# Patient Record
Sex: MALE | Race: WHITE | NOT HISPANIC OR LATINO
[De-identification: names, ages, dates, MRNs, and addresses within clinical notes are randomized per-mention and may not be internally consistent; named-entity substitution may affect disease eponyms.]

---

## 2020-07-13 ENCOUNTER — TRANSCRIPTION ENCOUNTER (OUTPATIENT)
Age: 85
End: 2020-07-13

## 2020-08-20 ENCOUNTER — TRANSCRIPTION ENCOUNTER (OUTPATIENT)
Age: 85
End: 2020-08-20

## 2020-10-04 ENCOUNTER — TRANSCRIPTION ENCOUNTER (OUTPATIENT)
Age: 85
End: 2020-10-04

## 2021-05-21 ENCOUNTER — EMERGENCY (EMERGENCY)
Facility: HOSPITAL | Age: 86
LOS: 0 days | Discharge: HOME | End: 2021-05-21
Attending: EMERGENCY MEDICINE | Admitting: EMERGENCY MEDICINE
Payer: MEDICARE

## 2021-05-21 VITALS
DIASTOLIC BLOOD PRESSURE: 79 MMHG | RESPIRATION RATE: 18 BRPM | WEIGHT: 154.98 LBS | SYSTOLIC BLOOD PRESSURE: 169 MMHG | OXYGEN SATURATION: 100 % | HEART RATE: 66 BPM | TEMPERATURE: 98 F

## 2021-05-21 VITALS — HEART RATE: 62 BPM | DIASTOLIC BLOOD PRESSURE: 70 MMHG | SYSTOLIC BLOOD PRESSURE: 154 MMHG

## 2021-05-21 DIAGNOSIS — I10 ESSENTIAL (PRIMARY) HYPERTENSION: ICD-10-CM

## 2021-05-21 DIAGNOSIS — R42 DIZZINESS AND GIDDINESS: ICD-10-CM

## 2021-05-21 DIAGNOSIS — F03.90 UNSPECIFIED DEMENTIA WITHOUT BEHAVIORAL DISTURBANCE: ICD-10-CM

## 2021-05-21 DIAGNOSIS — E11.9 TYPE 2 DIABETES MELLITUS WITHOUT COMPLICATIONS: ICD-10-CM

## 2021-05-21 DIAGNOSIS — E78.5 HYPERLIPIDEMIA, UNSPECIFIED: ICD-10-CM

## 2021-05-21 DIAGNOSIS — Z98.890 OTHER SPECIFIED POSTPROCEDURAL STATES: Chronic | ICD-10-CM

## 2021-05-21 DIAGNOSIS — D64.9 ANEMIA, UNSPECIFIED: ICD-10-CM

## 2021-05-21 LAB
ALBUMIN SERPL ELPH-MCNC: 4.6 G/DL — SIGNIFICANT CHANGE UP (ref 3.5–5.2)
ALP SERPL-CCNC: 88 U/L — SIGNIFICANT CHANGE UP (ref 30–115)
ALT FLD-CCNC: 26 U/L — SIGNIFICANT CHANGE UP (ref 0–41)
ANION GAP SERPL CALC-SCNC: 16 MMOL/L — HIGH (ref 7–14)
AST SERPL-CCNC: 30 U/L — SIGNIFICANT CHANGE UP (ref 0–41)
BASOPHILS # BLD AUTO: 0.02 K/UL — SIGNIFICANT CHANGE UP (ref 0–0.2)
BASOPHILS NFR BLD AUTO: 0.3 % — SIGNIFICANT CHANGE UP (ref 0–1)
BILIRUB SERPL-MCNC: 0.7 MG/DL — SIGNIFICANT CHANGE UP (ref 0.2–1.2)
BUN SERPL-MCNC: 22 MG/DL — HIGH (ref 10–20)
CALCIUM SERPL-MCNC: 9.6 MG/DL — SIGNIFICANT CHANGE UP (ref 8.5–10.1)
CHLORIDE SERPL-SCNC: 102 MMOL/L — SIGNIFICANT CHANGE UP (ref 98–110)
CO2 SERPL-SCNC: 23 MMOL/L — SIGNIFICANT CHANGE UP (ref 17–32)
CREAT SERPL-MCNC: 0.9 MG/DL — SIGNIFICANT CHANGE UP (ref 0.7–1.5)
EOSINOPHIL # BLD AUTO: 0.07 K/UL — SIGNIFICANT CHANGE UP (ref 0–0.7)
EOSINOPHIL NFR BLD AUTO: 1.2 % — SIGNIFICANT CHANGE UP (ref 0–8)
GLUCOSE SERPL-MCNC: 102 MG/DL — HIGH (ref 70–99)
HCT VFR BLD CALC: 37.2 % — LOW (ref 42–52)
HGB BLD-MCNC: 12.9 G/DL — LOW (ref 14–18)
IMM GRANULOCYTES NFR BLD AUTO: 0.3 % — SIGNIFICANT CHANGE UP (ref 0.1–0.3)
LYMPHOCYTES # BLD AUTO: 1.02 K/UL — LOW (ref 1.2–3.4)
LYMPHOCYTES # BLD AUTO: 17.5 % — LOW (ref 20.5–51.1)
MCHC RBC-ENTMCNC: 31.6 PG — HIGH (ref 27–31)
MCHC RBC-ENTMCNC: 34.7 G/DL — SIGNIFICANT CHANGE UP (ref 32–37)
MCV RBC AUTO: 91.2 FL — SIGNIFICANT CHANGE UP (ref 80–94)
MONOCYTES # BLD AUTO: 0.57 K/UL — SIGNIFICANT CHANGE UP (ref 0.1–0.6)
MONOCYTES NFR BLD AUTO: 9.8 % — HIGH (ref 1.7–9.3)
NEUTROPHILS # BLD AUTO: 4.12 K/UL — SIGNIFICANT CHANGE UP (ref 1.4–6.5)
NEUTROPHILS NFR BLD AUTO: 70.9 % — SIGNIFICANT CHANGE UP (ref 42.2–75.2)
NRBC # BLD: 0 /100 WBCS — SIGNIFICANT CHANGE UP (ref 0–0)
PLATELET # BLD AUTO: 170 K/UL — SIGNIFICANT CHANGE UP (ref 130–400)
POTASSIUM SERPL-MCNC: 4.8 MMOL/L — SIGNIFICANT CHANGE UP (ref 3.5–5)
POTASSIUM SERPL-SCNC: 4.8 MMOL/L — SIGNIFICANT CHANGE UP (ref 3.5–5)
PROT SERPL-MCNC: 6.7 G/DL — SIGNIFICANT CHANGE UP (ref 6–8)
RBC # BLD: 4.08 M/UL — LOW (ref 4.7–6.1)
RBC # FLD: 12.7 % — SIGNIFICANT CHANGE UP (ref 11.5–14.5)
SODIUM SERPL-SCNC: 141 MMOL/L — SIGNIFICANT CHANGE UP (ref 135–146)
WBC # BLD: 5.82 K/UL — SIGNIFICANT CHANGE UP (ref 4.8–10.8)
WBC # FLD AUTO: 5.82 K/UL — SIGNIFICANT CHANGE UP (ref 4.8–10.8)

## 2021-05-21 PROCEDURE — 99285 EMERGENCY DEPT VISIT HI MDM: CPT

## 2021-05-21 PROCEDURE — 70450 CT HEAD/BRAIN W/O DYE: CPT | Mod: 26,MA

## 2021-05-21 RX ORDER — MECLIZINE HCL 12.5 MG
25 TABLET ORAL ONCE
Refills: 0 | Status: COMPLETED | OUTPATIENT
Start: 2021-05-21 | End: 2021-05-21

## 2021-05-21 RX ORDER — MECLIZINE HCL 12.5 MG
1 TABLET ORAL
Qty: 15 | Refills: 0
Start: 2021-05-21 | End: 2021-05-25

## 2021-05-21 RX ADMIN — Medication 25 MILLIGRAM(S): at 10:37

## 2021-05-21 RX ADMIN — Medication 25 MILLIGRAM(S): at 10:29

## 2021-05-21 NOTE — ED PROVIDER NOTE - PROGRESS NOTE DETAILS
Authored by Dr. Cook: back from ct. continue monitoring. Authored by Dr. Cook: much improved. no symptoms. ambulatory in the ED without difficulty. son at bedside. will take pt home. f/u with dr Carr.  meclizine prescribed.

## 2021-05-21 NOTE — ED PROVIDER NOTE - PHYSICAL EXAMINATION
CONSTITUTIONAL: in no acute distress.   SKIN: warm, dry  HEAD: Normocephalic.  EYES: PERRL.  ENT: Airway clear.  NECK: Supple.  LYMPH: No acute cervical adenopathy.  CARD: Regular rate and rhythm.   RESP: No wheezing, rales or rhonchi.  ABD: soft ntnd  EXT: No clubbing, cyanosis.   NEURO: Alert, oriented. gait steady, no dysmetria, no pronator drift, speech clear, CN II-XII grossly intact, negative romberg  PSYCH: Cooperative, appropriate.

## 2021-05-21 NOTE — ED ADULT NURSE NOTE - NSIMPLEMENTINTERV_GEN_ALL_ED
Implemented All Fall with Harm Risk Interventions:  [place] to call system. Call bell, personal items and telephone within reach. Instruct patient to call for assistance. Room bathroom lighting operational. Non-slip footwear when patient is off stretcher. Physically safe environment: no spills, clutter or unnecessary equipment. Stretcher in lowest position, wheels locked, appropriate side rails in place. Provide visual cue, wrist band, yellow gown, etc. Monitor gait and stability. Monitor for mental status changes and reorient to person, place, and time. Review medications for side effects contributing to fall risk. Reinforce activity limits and safety measures with patient and family. Provide visual clues: red socks.

## 2021-05-21 NOTE — ED ADULT NURSE NOTE - OBJECTIVE STATEMENT
pt c/o dizziness x 2 days. pt states dizziness occurs when he suddenly turns his head. he states "the room is spinning" pt reports nausea, no vomiting. denies falls. denies fever/chills. pt has a h/o vertigo and iron transfusions.

## 2021-05-21 NOTE — ED PROVIDER NOTE - CLINICAL SUMMARY MEDICAL DECISION MAKING FREE TEXT BOX
vertigo, positional. no fnd. imaging and labs reviewed. meds given vertigo, positional. no fnd. imaging and labs reviewed. meds given  much improved. no symptoms. ambulatory in the ED without difficulty. son at bedside. will take pt home. f/u with dr Carr.  meclizine prescribed

## 2021-05-21 NOTE — ED PROVIDER NOTE - PATIENT PORTAL LINK FT
You can access the FollowMyHealth Patient Portal offered by [facility] by registering at the following website: [email or web address]. By joining [facility]’s FollowMyHealth portal, you will also be able to view your health information using other applications (apps) compatible with our system.

## 2021-05-21 NOTE — ED PROVIDER NOTE - OBJECTIVE STATEMENT
86M with past medical history of vertigo, anemia requiring iron infusion but no transfusions, HTN, and DM presents with dizziness yesterday and this morning. PT denies numbness, weakness, slurring of speech, nausea, vomiting, diarrhea, abdominal pain, dysuria. No problems with ambulation.

## 2021-05-21 NOTE — ED PROVIDER NOTE - NSFOLLOWUPINSTRUCTIONS_ED_ALL_ED_FT
SEE INFO SHEET    RETURN TO ED IF VOMITING, HEADACHE, CHEST PAIN OR WORSENING SYMPTOMS    FOLLOW UP WITH DR CRENSHAW IN 2-3 DAYS.

## 2021-05-21 NOTE — ED PROVIDER NOTE - ATTENDING CONTRIBUTION TO CARE
86 M hx of dm, htn, peripheral vertigo, now with 2 days of dizziness, worse with movt of head and certain positions. no ha. no vomiting, + mild nausea, no weakness.  vss, nontoxic, well appearing, PERRL, EOMI, pink conj, anicteric, tongue midline, MMM, neck supple, CTAB, RRR, equal radial pulses bilat, abd soft/nt/nd, no edema, no fnd. no rashes. nl gait, cn2-12 intact, no pronator drift, no cerebellar signs, no ataxia, nl finger to nose, neg rhomberg.   a/p: labs, imaging, reassess

## 2021-05-21 NOTE — ED PROVIDER NOTE - CARE PROVIDER_API CALL
Josh Carr  NEUROLOGY  [address] [place] [address]  Phone: [phone]  Fax: [phone]  Follow Up Time: 1-3 Days

## 2021-06-29 PROBLEM — R42 DIZZINESS AND GIDDINESS: Chronic | Status: ACTIVE | Noted: 2021-05-21

## 2021-06-29 PROBLEM — E11.9 TYPE 2 DIABETES MELLITUS WITHOUT COMPLICATIONS: Chronic | Status: ACTIVE | Noted: 2021-05-21

## 2021-06-29 PROBLEM — I10 ESSENTIAL (PRIMARY) HYPERTENSION: Chronic | Status: ACTIVE | Noted: 2021-05-21

## 2021-06-29 PROBLEM — E78.00 PURE HYPERCHOLESTEROLEMIA, UNSPECIFIED: Chronic | Status: ACTIVE | Noted: 2021-05-21

## 2021-06-29 PROBLEM — F03.90 UNSPECIFIED DEMENTIA WITHOUT BEHAVIORAL DISTURBANCE: Chronic | Status: ACTIVE | Noted: 2021-05-21

## 2021-07-01 ENCOUNTER — OUTPATIENT (OUTPATIENT)
Dept: OUTPATIENT SERVICES | Facility: HOSPITAL | Age: 86
LOS: 1 days | Discharge: HOME | End: 2021-07-01

## 2021-07-01 VITALS
TEMPERATURE: 97 F | SYSTOLIC BLOOD PRESSURE: 161 MMHG | HEIGHT: 69 IN | OXYGEN SATURATION: 100 % | WEIGHT: 154.98 LBS | RESPIRATION RATE: 20 BRPM | HEART RATE: 64 BPM | DIASTOLIC BLOOD PRESSURE: 76 MMHG

## 2021-07-01 VITALS — DIASTOLIC BLOOD PRESSURE: 75 MMHG | HEART RATE: 64 BPM | SYSTOLIC BLOOD PRESSURE: 163 MMHG

## 2021-07-01 DIAGNOSIS — Z98.890 OTHER SPECIFIED POSTPROCEDURAL STATES: Chronic | ICD-10-CM

## 2021-07-01 LAB — GLUCOSE BLDC GLUCOMTR-MCNC: 94 MG/DL — SIGNIFICANT CHANGE UP (ref 70–99)

## 2021-07-01 RX ORDER — ACETAMINOPHEN 500 MG
650 TABLET ORAL ONCE
Refills: 0 | Status: DISCONTINUED | OUTPATIENT
Start: 2021-07-01 | End: 2021-07-01

## 2021-07-01 NOTE — PRE-ANESTHESIA EVALUATION ADULT - NSANTHOSAYNRD_GEN_A_CORE
denies/No. GLENNY screening performed.  STOP BANG Legend: 0-2 = LOW Risk; 3-4 = INTERMEDIATE Risk; 5-8 = HIGH Risk

## 2021-07-01 NOTE — PRE-ANESTHESIA EVALUATION ADULT - NSANTHADDINFOFT_GEN_ALL_CORE
risks, benefits, alternatives, general anesthesia as a backup discussed with the patient and he agrees to proceed as planned

## 2021-07-01 NOTE — PACU DISCHARGE NOTE - COMMENTS
86 y o male S/P Right ECCE with IOL Implant and I Stent Inject, LSB/MAC without complications. VS /75 HR 65 RR 16 SaO2 100%. Pt tolerated procedure well.

## 2021-07-07 DIAGNOSIS — H40.89 OTHER SPECIFIED GLAUCOMA: ICD-10-CM

## 2021-07-07 DIAGNOSIS — E11.36 TYPE 2 DIABETES MELLITUS WITH DIABETIC CATARACT: ICD-10-CM

## 2021-07-07 DIAGNOSIS — H25.11 AGE-RELATED NUCLEAR CATARACT, RIGHT EYE: ICD-10-CM

## 2021-07-07 DIAGNOSIS — H21.561 PUPILLARY ABNORMALITY, RIGHT EYE: ICD-10-CM

## 2021-07-07 DIAGNOSIS — Z79.84 LONG TERM (CURRENT) USE OF ORAL HYPOGLYCEMIC DRUGS: ICD-10-CM

## 2021-07-08 ENCOUNTER — OUTPATIENT (OUTPATIENT)
Dept: OUTPATIENT SERVICES | Facility: HOSPITAL | Age: 86
LOS: 1 days | Discharge: HOME | End: 2021-07-08

## 2021-07-08 VITALS — RESPIRATION RATE: 17 BRPM | DIASTOLIC BLOOD PRESSURE: 78 MMHG | SYSTOLIC BLOOD PRESSURE: 143 MMHG | HEART RATE: 68 BPM

## 2021-07-08 VITALS
TEMPERATURE: 97 F | SYSTOLIC BLOOD PRESSURE: 134 MMHG | DIASTOLIC BLOOD PRESSURE: 67 MMHG | WEIGHT: 154.98 LBS | OXYGEN SATURATION: 100 % | HEART RATE: 66 BPM | HEIGHT: 69 IN | RESPIRATION RATE: 19 BRPM

## 2021-07-08 DIAGNOSIS — H26.9 UNSPECIFIED CATARACT: Chronic | ICD-10-CM

## 2021-07-08 DIAGNOSIS — Z98.890 OTHER SPECIFIED POSTPROCEDURAL STATES: Chronic | ICD-10-CM

## 2021-07-08 LAB — GLUCOSE BLDC GLUCOMTR-MCNC: 102 MG/DL — HIGH (ref 70–99)

## 2021-07-08 RX ORDER — BENAZEPRIL HYDROCHLORIDE 40 MG/1
1 TABLET ORAL
Qty: 0 | Refills: 0 | DISCHARGE

## 2021-07-08 RX ORDER — DONEPEZIL HYDROCHLORIDE 10 MG/1
1 TABLET, FILM COATED ORAL
Qty: 0 | Refills: 0 | DISCHARGE

## 2021-07-08 RX ORDER — LATANOPROST 0.05 MG/ML
1 SOLUTION/ DROPS OPHTHALMIC; TOPICAL
Qty: 0 | Refills: 0 | DISCHARGE

## 2021-07-08 RX ORDER — ATORVASTATIN CALCIUM 80 MG/1
1 TABLET, FILM COATED ORAL
Qty: 0 | Refills: 0 | DISCHARGE

## 2021-07-08 RX ORDER — OMEPRAZOLE 10 MG/1
1 CAPSULE, DELAYED RELEASE ORAL
Qty: 0 | Refills: 0 | DISCHARGE

## 2021-07-08 RX ORDER — METFORMIN HYDROCHLORIDE 850 MG/1
500 TABLET ORAL
Qty: 0 | Refills: 0 | DISCHARGE

## 2021-07-08 NOTE — ASU PREOP CHECKLIST - SITE MARKED BY SURGEON
----- Message from Kassi Veronica sent at 3/11/2019 11:45 AM CDT -----  Contact: [facility] Radha [phone]  Radha from [facility] is requesting a a new prescription for a diabetic meter and the diabetic supplies. Radha also needs clinical notes and necessity forms.      Fax# [phone]  Please advise, thanks   
yes

## 2021-07-08 NOTE — ASU PATIENT PROFILE, ADULT - BRAND OF COVID-19 VACCINATION
Pfizer dose 1 and 2 [General Appearance - Well Developed] : well developed [General Appearance - Well Nourished] : well nourished [Normal Appearance] : normal appearance [Well Groomed] : well groomed [General Appearance - In No Acute Distress] : no acute distress [Abdomen Soft] : soft [Abdomen Tenderness] : non-tender [Costovertebral Angle Tenderness] : no ~M costovertebral angle tenderness [Edema] : no peripheral edema [] : no respiratory distress [Respiration, Rhythm And Depth] : normal respiratory rhythm and effort [Exaggerated Use Of Accessory Muscles For Inspiration] : no accessory muscle use [Oriented To Time, Place, And Person] : oriented to person, place, and time [Affect] : the affect was normal [Mood] : the mood was normal [Not Anxious] : not anxious [Normal Station and Gait] : the gait and station were normal for the patient's age [No Focal Deficits] : no focal deficits [No Palpable Adenopathy] : no palpable adenopathy

## 2021-07-12 DIAGNOSIS — H40.10X2 UNSPECIFIED OPEN-ANGLE GLAUCOMA, MODERATE STAGE: ICD-10-CM

## 2021-07-12 DIAGNOSIS — E11.9 TYPE 2 DIABETES MELLITUS WITHOUT COMPLICATIONS: ICD-10-CM

## 2021-07-12 DIAGNOSIS — H25.12 AGE-RELATED NUCLEAR CATARACT, LEFT EYE: ICD-10-CM

## 2021-07-12 DIAGNOSIS — I10 ESSENTIAL (PRIMARY) HYPERTENSION: ICD-10-CM

## 2021-07-12 DIAGNOSIS — R42 DIZZINESS AND GIDDINESS: ICD-10-CM

## 2021-07-12 DIAGNOSIS — F03.90 UNSPECIFIED DEMENTIA WITHOUT BEHAVIORAL DISTURBANCE: ICD-10-CM

## 2021-07-12 DIAGNOSIS — E78.00 PURE HYPERCHOLESTEROLEMIA, UNSPECIFIED: ICD-10-CM

## 2021-09-30 ENCOUNTER — EMERGENCY (EMERGENCY)
Facility: HOSPITAL | Age: 86
LOS: 0 days | Discharge: HOME | End: 2021-09-30
Attending: STUDENT IN AN ORGANIZED HEALTH CARE EDUCATION/TRAINING PROGRAM | Admitting: STUDENT IN AN ORGANIZED HEALTH CARE EDUCATION/TRAINING PROGRAM
Payer: MEDICARE

## 2021-09-30 VITALS
RESPIRATION RATE: 18 BRPM | SYSTOLIC BLOOD PRESSURE: 192 MMHG | DIASTOLIC BLOOD PRESSURE: 82 MMHG | HEART RATE: 66 BPM | TEMPERATURE: 98 F | OXYGEN SATURATION: 98 % | HEIGHT: 69 IN

## 2021-09-30 DIAGNOSIS — I10 ESSENTIAL (PRIMARY) HYPERTENSION: ICD-10-CM
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
## 2021-09-30 DIAGNOSIS — E11.9 TYPE 2 DIABETES MELLITUS WITHOUT COMPLICATIONS: ICD-10-CM

## 2021-09-30 DIAGNOSIS — Z86.69 PERSONAL HISTORY OF OTHER DISEASES OF THE NERVOUS SYSTEM AND SENSE ORGANS: ICD-10-CM

## 2021-09-30 DIAGNOSIS — Z79.84 LONG TERM (CURRENT) USE OF ORAL HYPOGLYCEMIC DRUGS: ICD-10-CM

## 2021-09-30 DIAGNOSIS — Z98.890 OTHER SPECIFIED POSTPROCEDURAL STATES: Chronic | ICD-10-CM

## 2021-09-30 DIAGNOSIS — E78.5 HYPERLIPIDEMIA, UNSPECIFIED: ICD-10-CM

## 2021-09-30 DIAGNOSIS — H53.8 OTHER VISUAL DISTURBANCES: ICD-10-CM

## 2021-09-30 DIAGNOSIS — H57.11 OCULAR PAIN, RIGHT EYE: ICD-10-CM

## 2021-09-30 DIAGNOSIS — H26.9 UNSPECIFIED CATARACT: Chronic | ICD-10-CM

## 2021-09-30 DIAGNOSIS — S05.01XA INJURY OF CONJUNCTIVA AND CORNEAL ABRASION WITHOUT FOREIGN BODY, RIGHT EYE, INITIAL ENCOUNTER: ICD-10-CM

## 2021-09-30 PROCEDURE — 99283 EMERGENCY DEPT VISIT LOW MDM: CPT

## 2021-09-30 RX ORDER — OFLOXACIN 0.3 %
2 DROPS OPHTHALMIC (EYE)
Qty: 5 | Refills: 0
Start: 2021-09-30 | End: 2021-10-06

## 2021-09-30 NOTE — ED PROVIDER NOTE - NSFOLLOWUPCLINICS_GEN_ALL_ED_FT
[facility]  Ophthalmolgy  [address], [address]  [place] [address]  Phone: [phone]  Fax:   Follow Up Time: 4-6 Days

## 2021-09-30 NOTE — ED PROVIDER NOTE - NS ED ROS FT
Constitutional: No fever, chills, weight loss, generalized fatigue  Eyes:  +visual changes, +eye pain  and +discharge  Neuro:  No headache or weakness.  No LOC  Skin:  No skin rash

## 2021-09-30 NOTE — ED PROVIDER NOTE - PHYSICAL EXAMINATION
CONSTITUTIONAL: Well-developed; well-nourished  SKIN: warm, dry  EYES: PERRL, EOMI, right eye conjunctival erythema, R eye: 17mmHg 20/30 L eye: 15mmHg 20/25, right eye has horizontal linear corneal abrasion, no foreign body found  NEURO: Alert, oriented, grossly unremarkable  PSYCH: Cooperative, appropriate.

## 2021-09-30 NOTE — ED PROVIDER NOTE - OBJECTIVE STATEMENT
86yM w/ pmhx of glaucoma htn hld dm who present with right eye pain started yesterday. son stated that he saw pt scratch his eye when something flew into eye yesterday in their yard. eye is more blurry on right side, has pain, and light sensitivity.

## 2021-09-30 NOTE — ED PROVIDER NOTE - CLINICAL SUMMARY MEDICAL DECISION MAKING FREE TEXT BOX
86 yr old m that presents with corneal abrasion. will dc pt with otic antibiotics and optho follow up. pt given strict return precautions.

## 2021-09-30 NOTE — ED PROVIDER NOTE - PATIENT PORTAL LINK FT
You can access the FollowMyHealth Patient Portal offered by [facility] by registering at the following website: [email or web address]. By joining [employer]’s FollowMyHealth portal, you will also be able to view your health information using other applications (apps) compatible with our system.

## 2021-09-30 NOTE — ED PROVIDER NOTE - ATTENDING CONTRIBUTION TO CARE
86 yr old m w/ a pmh significant for glaucoma, htn, hld, dm who presents with R eye pain. Pt states that the pain started yesterday after scratching his eye. Pt does report some blurriness and pain, however, pt denies any HA, nausea, vomiting, fevers, chills or any other complaints.    VITAL SIGNS: I have reviewed nursing notes and confirm.  CONSTITUTIONAL: non-toxic, well appearing  SKIN: no rash, no petechiae.  EYES: PERRL, EOMI, right eye conjunctival erythema, R eye: 17mmHg 20/30 L eye: 15mmHg 20/25, right eye has horizontal linear corneal abrasion, no foreign body found  ENT: tongue midline, no exudates, MMM  NECK: Supple; no meningismus, no JVD  RESP: no respiratory distress  EXT: Normal ROM x4. No edema. No calves tenderness  NEURO: Alert, oriented. CN2-12 intact, equal strength bilaterally, nl gait.  PSYCH: Cooperative, appropriate.    a/p  86 yr old m that presents with corneal abrasion. will dc pt with otic antibiotics and optho follow up. pt given strict return precautions.

## 2021-09-30 NOTE — ED ADULT NURSE NOTE - NSSUHOSCREENINGYN_ED_ALL_ED
Seema from lab called, question if recollect is nec for repeat platelet count.   Yes - the patient is able to be screened

## 2022-03-23 ENCOUNTER — NEW REFERRAL (OUTPATIENT)
Dept: URBAN - METROPOLITAN AREA CLINIC 109 | Facility: CLINIC | Age: 87
End: 2022-03-23

## 2022-03-23 DIAGNOSIS — H43.393: ICD-10-CM

## 2022-03-23 DIAGNOSIS — H35.371: ICD-10-CM

## 2022-03-23 DIAGNOSIS — H43.823: ICD-10-CM

## 2022-03-23 PROCEDURE — 92202 OPSCPY EXTND ON/MAC DRAW: CPT

## 2022-03-23 PROCEDURE — 99244 OFF/OP CNSLTJ NEW/EST MOD 40: CPT

## 2022-03-23 PROCEDURE — 92134 CPTRZ OPH DX IMG PST SGM RTA: CPT

## 2022-03-23 ASSESSMENT — VISUAL ACUITY
OD_PH: 20/80
OS_SC: 20/160
OD_SC: 20/100
OS_PH: 20/50-3

## 2022-03-23 ASSESSMENT — TONOMETRY
OD_IOP_MMHG: 22
OS_IOP_MMHG: 19

## 2022-05-25 ENCOUNTER — FOLLOW UP (OUTPATIENT)
Dept: URBAN - METROPOLITAN AREA CLINIC 109 | Facility: CLINIC | Age: 87
End: 2022-05-25

## 2022-05-25 DIAGNOSIS — H43.823: ICD-10-CM

## 2022-05-25 DIAGNOSIS — H35.371: ICD-10-CM

## 2022-05-25 DIAGNOSIS — D31.32: ICD-10-CM

## 2022-05-25 DIAGNOSIS — H35.353: ICD-10-CM

## 2022-05-25 PROCEDURE — 92014 COMPRE OPH EXAM EST PT 1/>: CPT

## 2022-05-25 PROCEDURE — 92134 CPTRZ OPH DX IMG PST SGM RTA: CPT

## 2022-05-25 PROCEDURE — 92202 OPSCPY EXTND ON/MAC DRAW: CPT

## 2022-05-25 ASSESSMENT — TONOMETRY
OD_IOP_MMHG: 26
OS_IOP_MMHG: 28

## 2022-05-25 ASSESSMENT — VISUAL ACUITY
OD_PH: 20/60-1
OS_PH: 20/60-2
OS_SC: 20/160-1
OD_SC: 20/100+1

## 2022-06-29 ENCOUNTER — FOLLOW UP (OUTPATIENT)
Dept: URBAN - METROPOLITAN AREA CLINIC 109 | Facility: CLINIC | Age: 87
End: 2022-06-29

## 2022-06-29 DIAGNOSIS — H43.823: ICD-10-CM

## 2022-06-29 DIAGNOSIS — H35.353: ICD-10-CM

## 2022-06-29 DIAGNOSIS — H40.043: ICD-10-CM

## 2022-06-29 DIAGNOSIS — H35.371: ICD-10-CM

## 2022-06-29 PROCEDURE — 92014 COMPRE OPH EXAM EST PT 1/>: CPT

## 2022-06-29 PROCEDURE — 92134 CPTRZ OPH DX IMG PST SGM RTA: CPT

## 2022-06-29 PROCEDURE — 92202 OPSCPY EXTND ON/MAC DRAW: CPT

## 2022-06-29 ASSESSMENT — VISUAL ACUITY
OS_PH: 20/60+3
OD_SC: 20/60-1
OS_SC: 20/160+1
OD_PH: 20/50-3

## 2022-06-29 ASSESSMENT — TONOMETRY
OD_IOP_MMHG: 21
OS_IOP_MMHG: 18

## 2022-07-20 ENCOUNTER — SURGERY/PROCEDURE (OUTPATIENT)
Dept: URBAN - METROPOLITAN AREA MEDICAL CENTER 2 | Facility: MEDICAL CENTER | Age: 87
End: 2022-07-20

## 2022-07-20 DIAGNOSIS — H43.823: ICD-10-CM

## 2022-07-20 PROCEDURE — 67041 VIT FOR MACULAR PUCKER: CPT

## 2022-07-20 PROCEDURE — 67042 VIT FOR MACULAR HOLE: CPT

## 2022-07-21 ENCOUNTER — 1 DAY POST-OP (OUTPATIENT)
Dept: URBAN - METROPOLITAN AREA CLINIC 51 | Facility: CLINIC | Age: 87
End: 2022-07-21

## 2022-07-21 DIAGNOSIS — H43.823: ICD-10-CM

## 2022-07-21 PROCEDURE — 92202 OPSCPY EXTND ON/MAC DRAW: CPT

## 2022-07-21 PROCEDURE — 99024 POSTOP FOLLOW-UP VISIT: CPT

## 2022-07-21 ASSESSMENT — VISUAL ACUITY
OD_SC: 20/80-1
OS_SC: 1/200
OS_PH: 2/200
OD_PH: 20/50-2

## 2022-07-21 ASSESSMENT — TONOMETRY
OD_IOP_MMHG: 22
OS_IOP_MMHG: 21
OS_IOP_MMHG: 22
OD_IOP_MMHG: 21

## 2022-07-27 ENCOUNTER — FOLLOW UP (OUTPATIENT)
Dept: URBAN - METROPOLITAN AREA CLINIC 109 | Facility: CLINIC | Age: 87
End: 2022-07-27

## 2022-07-27 DIAGNOSIS — H40.043: ICD-10-CM

## 2022-07-27 DIAGNOSIS — H00.011: ICD-10-CM

## 2022-07-27 DIAGNOSIS — H35.371: ICD-10-CM

## 2022-07-27 DIAGNOSIS — H43.823: ICD-10-CM

## 2022-07-27 DIAGNOSIS — H35.353: ICD-10-CM

## 2022-07-27 PROCEDURE — 92202 OPSCPY EXTND ON/MAC DRAW: CPT

## 2022-07-27 PROCEDURE — 92134 CPTRZ OPH DX IMG PST SGM RTA: CPT

## 2022-07-27 PROCEDURE — 92014 COMPRE OPH EXAM EST PT 1/>: CPT

## 2022-07-27 ASSESSMENT — VISUAL ACUITY
OS_PH: 20/60-3
OD_SC: 20/80-1
OS_SC: 20/125-2
OD_PH: 20/50-3

## 2022-07-27 ASSESSMENT — TONOMETRY
OS_IOP_MMHG: 16
OD_IOP_MMHG: 19

## 2022-08-17 ENCOUNTER — POST-OP CHECK (OUTPATIENT)
Dept: URBAN - METROPOLITAN AREA CLINIC 109 | Facility: CLINIC | Age: 87
End: 2022-08-17

## 2022-08-17 DIAGNOSIS — H00.011: ICD-10-CM

## 2022-08-17 DIAGNOSIS — H35.353: ICD-10-CM

## 2022-08-17 DIAGNOSIS — H40.043: ICD-10-CM

## 2022-08-17 DIAGNOSIS — H43.823: ICD-10-CM

## 2022-08-17 DIAGNOSIS — H35.371: ICD-10-CM

## 2022-08-17 PROCEDURE — 92202 OPSCPY EXTND ON/MAC DRAW: CPT

## 2022-08-17 PROCEDURE — 99024 POSTOP FOLLOW-UP VISIT: CPT

## 2022-08-17 ASSESSMENT — TONOMETRY
OS_IOP_MMHG: 20
OD_IOP_MMHG: 19

## 2022-08-17 ASSESSMENT — VISUAL ACUITY
OS_PH: 20/60+3
OS_SC: 20/125-1
OD_PH: 20/60+3
OD_SC: 20/60-3

## 2022-09-02 ENCOUNTER — PROBLEM (OUTPATIENT)
Dept: URBAN - METROPOLITAN AREA CLINIC 109 | Facility: CLINIC | Age: 87
End: 2022-09-02

## 2022-09-02 DIAGNOSIS — H35.353: ICD-10-CM

## 2022-09-02 DIAGNOSIS — H35.371: ICD-10-CM

## 2022-09-02 DIAGNOSIS — H40.043: ICD-10-CM

## 2022-09-02 DIAGNOSIS — H00.011: ICD-10-CM

## 2022-09-02 DIAGNOSIS — H43.823: ICD-10-CM

## 2022-09-02 PROCEDURE — 92202 OPSCPY EXTND ON/MAC DRAW: CPT | Mod: NC

## 2022-09-02 PROCEDURE — 99024 POSTOP FOLLOW-UP VISIT: CPT

## 2022-09-02 PROCEDURE — 92134 CPTRZ OPH DX IMG PST SGM RTA: CPT | Mod: NC

## 2022-09-02 ASSESSMENT — VISUAL ACUITY
OS_SC: 20/125+1
OS_PH: 20/50-1
OD_SC: 20/60

## 2022-09-02 ASSESSMENT — TONOMETRY
OD_IOP_MMHG: 19
OS_IOP_MMHG: 18

## 2022-10-04 ENCOUNTER — PROBLEM (OUTPATIENT)
Dept: URBAN - METROPOLITAN AREA CLINIC 109 | Facility: CLINIC | Age: 87
End: 2022-10-04

## 2022-10-04 DIAGNOSIS — H35.353: ICD-10-CM

## 2022-10-04 DIAGNOSIS — H43.823: ICD-10-CM

## 2022-10-04 DIAGNOSIS — D31.32: ICD-10-CM

## 2022-10-04 PROCEDURE — 99024 POSTOP FOLLOW-UP VISIT: CPT

## 2022-10-04 PROCEDURE — 92134 CPTRZ OPH DX IMG PST SGM RTA: CPT

## 2022-10-04 PROCEDURE — 92202 OPSCPY EXTND ON/MAC DRAW: CPT

## 2022-10-04 ASSESSMENT — TONOMETRY
OD_IOP_MMHG: 17
OS_IOP_MMHG: 18

## 2022-10-04 ASSESSMENT — VISUAL ACUITY
OS_SC: 20/100-2
OD_PH: 20/60-3
OD_SC: 20/80
OS_PH: 20/50-3

## 2022-10-28 NOTE — ED ADULT NURSE NOTE - CADM POA URETHRAL CATHETER
Render In Strict Bullet Format?: No Detail Level: Zone Continue Regimen: Clobetasol cream bid to aa on knee No

## 2022-11-02 ENCOUNTER — FOLLOW UP (OUTPATIENT)
Dept: URBAN - METROPOLITAN AREA CLINIC 109 | Facility: CLINIC | Age: 87
End: 2022-11-02

## 2022-11-02 DIAGNOSIS — D31.32: ICD-10-CM

## 2022-11-02 DIAGNOSIS — H43.823: ICD-10-CM

## 2022-11-02 DIAGNOSIS — H35.353: ICD-10-CM

## 2022-11-02 PROCEDURE — 92134 CPTRZ OPH DX IMG PST SGM RTA: CPT

## 2022-11-02 PROCEDURE — 92202 OPSCPY EXTND ON/MAC DRAW: CPT

## 2022-11-02 PROCEDURE — 99214 OFFICE O/P EST MOD 30 MIN: CPT

## 2022-11-02 ASSESSMENT — TONOMETRY
OS_IOP_MMHG: 17
OD_IOP_MMHG: 14

## 2022-11-02 ASSESSMENT — VISUAL ACUITY
OS_SC: 20/100-2
OS_PH: 20/50-2
OD_PH: 20/50-2
OD_SC: 20/80-1

## 2023-01-25 PROBLEM — Z00.00 ENCOUNTER FOR PREVENTIVE HEALTH EXAMINATION: Status: ACTIVE | Noted: 2023-01-25

## 2023-01-26 ENCOUNTER — APPOINTMENT (OUTPATIENT)
Dept: NEUROLOGY | Facility: CLINIC | Age: 88
End: 2023-01-26

## 2023-01-30 ENCOUNTER — APPOINTMENT (OUTPATIENT)
Dept: NEUROLOGY | Facility: CLINIC | Age: 88
End: 2023-01-30

## 2023-01-30 RX ORDER — METFORMIN HYDROCHLORIDE 500 MG/1
500 TABLET, COATED ORAL
Refills: 0 | Status: ACTIVE | COMMUNITY

## 2023-01-30 RX ORDER — ATORVASTATIN CALCIUM 20 MG/1
20 TABLET, FILM COATED ORAL
Refills: 0 | Status: ACTIVE | COMMUNITY

## 2023-01-30 RX ORDER — MECLIZINE HCL 25 MG/1
25 TABLET ORAL
Refills: 0 | Status: ACTIVE | COMMUNITY

## 2023-02-01 ENCOUNTER — FOLLOW UP (OUTPATIENT)
Dept: URBAN - METROPOLITAN AREA CLINIC 109 | Facility: CLINIC | Age: 88
End: 2023-02-01

## 2023-02-01 DIAGNOSIS — H43.823: ICD-10-CM

## 2023-02-01 DIAGNOSIS — H40.043: ICD-10-CM

## 2023-02-01 DIAGNOSIS — H35.353: ICD-10-CM

## 2023-02-01 DIAGNOSIS — H35.371: ICD-10-CM

## 2023-02-01 PROCEDURE — 92202 OPSCPY EXTND ON/MAC DRAW: CPT

## 2023-02-01 PROCEDURE — 92014 COMPRE OPH EXAM EST PT 1/>: CPT

## 2023-02-01 PROCEDURE — 92134 CPTRZ OPH DX IMG PST SGM RTA: CPT

## 2023-02-01 ASSESSMENT — TONOMETRY
OD_IOP_MMHG: 12
OS_IOP_MMHG: 15

## 2023-02-01 ASSESSMENT — VISUAL ACUITY
OD_PH: 20/50+3
OS_PH: 20/50-2
OD_CC: 20/50-3
OS_CC: 20/60-2

## 2023-04-07 ENCOUNTER — FOLLOW UP (OUTPATIENT)
Dept: URBAN - METROPOLITAN AREA CLINIC 109 | Facility: CLINIC | Age: 88
End: 2023-04-07

## 2023-04-07 DIAGNOSIS — H35.353: ICD-10-CM

## 2023-04-07 DIAGNOSIS — H40.043: ICD-10-CM

## 2023-04-07 DIAGNOSIS — H43.823: ICD-10-CM

## 2023-04-07 DIAGNOSIS — D31.32: ICD-10-CM

## 2023-04-07 PROCEDURE — 92014 COMPRE OPH EXAM EST PT 1/>: CPT

## 2023-04-07 PROCEDURE — 92134 CPTRZ OPH DX IMG PST SGM RTA: CPT

## 2023-04-07 PROCEDURE — 92202 OPSCPY EXTND ON/MAC DRAW: CPT

## 2023-04-07 ASSESSMENT — TONOMETRY
OS_IOP_MMHG: 16
OD_IOP_MMHG: 17

## 2023-04-07 ASSESSMENT — VISUAL ACUITY
OS_CC: 20/80
OD_PH: 20/60
OD_CC: 20/60-3
OS_PH: 20/60-3

## 2023-07-14 ENCOUNTER — FOLLOW UP (OUTPATIENT)
Dept: URBAN - METROPOLITAN AREA CLINIC 109 | Facility: CLINIC | Age: 88
End: 2023-07-14

## 2023-07-14 DIAGNOSIS — H40.043: ICD-10-CM

## 2023-07-14 DIAGNOSIS — H35.353: ICD-10-CM

## 2023-07-14 DIAGNOSIS — E11.9: ICD-10-CM

## 2023-07-14 DIAGNOSIS — H43.823: ICD-10-CM

## 2023-07-14 PROCEDURE — 92134 CPTRZ OPH DX IMG PST SGM RTA: CPT

## 2023-07-14 PROCEDURE — 92014 COMPRE OPH EXAM EST PT 1/>: CPT

## 2023-07-14 PROCEDURE — 92202 OPSCPY EXTND ON/MAC DRAW: CPT

## 2023-07-14 ASSESSMENT — VISUAL ACUITY
OD_PH: 20/50
OS_SC: 20/60
OD_SC: 20/80

## 2023-07-14 ASSESSMENT — TONOMETRY
OS_IOP_MMHG: 20
OD_IOP_MMHG: 16

## 2023-10-13 ENCOUNTER — FOLLOW UP (OUTPATIENT)
Dept: URBAN - METROPOLITAN AREA CLINIC 109 | Facility: CLINIC | Age: 88
End: 2023-10-13

## 2023-10-13 DIAGNOSIS — H43.823: ICD-10-CM

## 2023-10-13 DIAGNOSIS — E11.9: ICD-10-CM

## 2023-10-13 DIAGNOSIS — H35.353: ICD-10-CM

## 2023-10-13 DIAGNOSIS — H40.043: ICD-10-CM

## 2023-10-13 PROCEDURE — 92202 OPSCPY EXTND ON/MAC DRAW: CPT

## 2023-10-13 PROCEDURE — 92134 CPTRZ OPH DX IMG PST SGM RTA: CPT

## 2023-10-13 PROCEDURE — 92014 COMPRE OPH EXAM EST PT 1/>: CPT

## 2023-10-13 ASSESSMENT — VISUAL ACUITY
OS_PH: 20/50-3
OD_CC: 20/60
OS_CC: 20/80

## 2023-10-13 ASSESSMENT — TONOMETRY
OS_IOP_MMHG: 18
OD_IOP_MMHG: 16

## 2024-01-26 ENCOUNTER — FOLLOW UP (OUTPATIENT)
Dept: URBAN - METROPOLITAN AREA CLINIC 109 | Facility: CLINIC | Age: 89
End: 2024-01-26

## 2024-01-26 DIAGNOSIS — H40.043: ICD-10-CM

## 2024-01-26 DIAGNOSIS — D31.32: ICD-10-CM

## 2024-01-26 DIAGNOSIS — H40.1131: ICD-10-CM

## 2024-01-26 DIAGNOSIS — H35.353: ICD-10-CM

## 2024-01-26 DIAGNOSIS — E11.9: ICD-10-CM

## 2024-01-26 DIAGNOSIS — H35.371: ICD-10-CM

## 2024-01-26 DIAGNOSIS — H43.823: ICD-10-CM

## 2024-01-26 PROCEDURE — 92014 COMPRE OPH EXAM EST PT 1/>: CPT

## 2024-01-26 PROCEDURE — 92134 CPTRZ OPH DX IMG PST SGM RTA: CPT

## 2024-01-26 PROCEDURE — 92202 OPSCPY EXTND ON/MAC DRAW: CPT

## 2024-01-26 ASSESSMENT — TONOMETRY
OS_IOP_MMHG: 17
OD_IOP_MMHG: 19

## 2024-01-26 ASSESSMENT — VISUAL ACUITY
OS_PH: 20/50+3
OS_CC: 20/50-2
OD_PH: 20/40+3
OD_CC: 20/40-1

## 2024-04-19 ENCOUNTER — FOLLOW UP (OUTPATIENT)
Dept: URBAN - METROPOLITAN AREA CLINIC 109 | Facility: CLINIC | Age: 89
End: 2024-04-19

## 2024-04-19 DIAGNOSIS — H40.1131: ICD-10-CM

## 2024-04-19 DIAGNOSIS — H35.353: ICD-10-CM

## 2024-04-19 DIAGNOSIS — H43.823: ICD-10-CM

## 2024-04-19 DIAGNOSIS — E11.9: ICD-10-CM

## 2024-04-19 PROCEDURE — 92202 OPSCPY EXTND ON/MAC DRAW: CPT

## 2024-04-19 PROCEDURE — 92014 COMPRE OPH EXAM EST PT 1/>: CPT

## 2024-04-19 PROCEDURE — 92134 CPTRZ OPH DX IMG PST SGM RTA: CPT

## 2024-04-19 ASSESSMENT — TONOMETRY
OS_IOP_MMHG: 17
OD_IOP_MMHG: 20

## 2024-04-19 ASSESSMENT — VISUAL ACUITY
OS_CC: 20/50-3
OD_CC: 20/50-2

## 2024-07-19 ENCOUNTER — FOLLOW UP (OUTPATIENT)
Dept: URBAN - METROPOLITAN AREA CLINIC 1 | Facility: CLINIC | Age: 89
End: 2024-07-19

## 2024-07-19 DIAGNOSIS — H40.1131: ICD-10-CM

## 2024-07-19 DIAGNOSIS — E11.9: ICD-10-CM

## 2024-07-19 DIAGNOSIS — H35.353: ICD-10-CM

## 2024-07-19 DIAGNOSIS — H43.823: ICD-10-CM

## 2024-07-19 DIAGNOSIS — D31.32: ICD-10-CM

## 2024-07-19 PROCEDURE — 92202 OPSCPY EXTND ON/MAC DRAW: CPT

## 2024-07-19 PROCEDURE — 92014 COMPRE OPH EXAM EST PT 1/>: CPT

## 2024-07-19 PROCEDURE — 92134 CPTRZ OPH DX IMG PST SGM RTA: CPT

## 2024-07-19 ASSESSMENT — TONOMETRY
OD_IOP_MMHG: 11
OS_IOP_MMHG: 13

## 2024-07-19 ASSESSMENT — VISUAL ACUITY
OS_CC: 20/40-1
OD_PH: 20/40
OD_CC: 20/40-2

## 2024-08-20 NOTE — ED ADULT TRIAGE NOTE - MEANS OF ARRIVAL
----- Message from Brittanie Childress sent at 8/19/2024  4:58 PM EDT -----  Regarding: colonoscopy  Please call [facility] and get the last Colonoscopy report from Dr Mayers / Dr Hoff office.   ambulatory

## 2024-09-20 ENCOUNTER — FOLLOW UP (OUTPATIENT)
Dept: URBAN - METROPOLITAN AREA CLINIC 1 | Facility: CLINIC | Age: 89
End: 2024-09-20

## 2024-09-20 DIAGNOSIS — H40.1131: ICD-10-CM

## 2024-09-20 DIAGNOSIS — D31.32: ICD-10-CM

## 2024-09-20 DIAGNOSIS — H43.823: ICD-10-CM

## 2024-09-20 DIAGNOSIS — H35.353: ICD-10-CM

## 2024-09-20 DIAGNOSIS — E11.9: ICD-10-CM

## 2024-09-20 PROCEDURE — 92134 CPTRZ OPH DX IMG PST SGM RTA: CPT

## 2024-09-20 PROCEDURE — 92014 COMPRE OPH EXAM EST PT 1/>: CPT | Mod: 25

## 2024-09-20 PROCEDURE — 67515 INJECT/TREAT EYE SOCKET: CPT

## 2024-09-20 PROCEDURE — 92202 OPSCPY EXTND ON/MAC DRAW: CPT | Mod: NC

## 2024-09-20 ASSESSMENT — TONOMETRY
OD_IOP_MMHG: 12
OS_IOP_MMHG: 13

## 2024-09-20 ASSESSMENT — VISUAL ACUITY
OD_CC: 20/40-2
OS_CC: 20/40

## 2025-01-16 NOTE — ASU PATIENT PROFILE, ADULT - SUPPORT PERSON PHONE
Discharge back to current living situation work with your  on next steps May use Zyprexa to help with anxiety and sleep at bedtime follow-up with your primary provider this next week.  
[phone]

## (undated) RX ORDER — BROMFENAC SODIUM 0.7 MG/ML: 1 SOLUTION/ DROPS OPHTHALMIC ONCE A DAY

## (undated) RX ORDER — DORZOLAMIDE 20 MG/ML
1 SOLUTION/ DROPS OPHTHALMIC TWICE A DAY
Start: 2023-02-01

## (undated) RX ORDER — KETOROLAC TROMETHAMINE 5 MG/ML: 1 SOLUTION OPHTHALMIC TWICE A DAY

## (undated) RX ORDER — PREDNISOLONE ACETATE 10 MG/ML
1 SUSPENSION/ DROPS OPHTHALMIC
Start: 2022-07-21

## (undated) RX ORDER — LATANOPROST 50 UG/ML: 1 SOLUTION/ DROPS OPHTHALMIC EVERY EVENING

## (undated) RX ORDER — OFLOXACIN 3 MG/ML
1 SOLUTION/ DROPS OPHTHALMIC
Start: 2022-07-21

## (undated) RX ORDER — TOBRAMYCIN AND DEXAMETHASONE 3; 1 MG/G; MG/G: 1/2 OINTMENT OPHTHALMIC TWICE A DAY

## (undated) RX ORDER — BROMFENAC SODIUM 0.7 MG/ML: 1 SOLUTION/ DROPS OPHTHALMIC TWICE A DAY